# Patient Record
Sex: FEMALE | Race: WHITE | NOT HISPANIC OR LATINO | URBAN - METROPOLITAN AREA
[De-identification: names, ages, dates, MRNs, and addresses within clinical notes are randomized per-mention and may not be internally consistent; named-entity substitution may affect disease eponyms.]

---

## 2017-01-27 ENCOUNTER — OUTPATIENT (OUTPATIENT)
Dept: OUTPATIENT SERVICES | Facility: HOSPITAL | Age: 67
LOS: 1 days | Discharge: HOME | End: 2017-01-27

## 2017-06-27 DIAGNOSIS — K29.50 UNSPECIFIED CHRONIC GASTRITIS WITHOUT BLEEDING: ICD-10-CM

## 2017-06-27 DIAGNOSIS — I10 ESSENTIAL (PRIMARY) HYPERTENSION: ICD-10-CM

## 2017-06-27 DIAGNOSIS — K44.9 DIAPHRAGMATIC HERNIA WITHOUT OBSTRUCTION OR GANGRENE: ICD-10-CM

## 2017-06-27 DIAGNOSIS — K83.9 DISEASE OF BILIARY TRACT, UNSPECIFIED: ICD-10-CM

## 2017-06-27 DIAGNOSIS — K86.2 CYST OF PANCREAS: ICD-10-CM

## 2018-01-05 PROBLEM — Z00.00 ENCOUNTER FOR PREVENTIVE HEALTH EXAMINATION: Status: ACTIVE | Noted: 2018-01-05

## 2018-01-09 ENCOUNTER — RECORD ABSTRACTING (OUTPATIENT)
Age: 68
End: 2018-01-09

## 2018-01-09 DIAGNOSIS — Z87.19 PERSONAL HISTORY OF OTHER DISEASES OF THE DIGESTIVE SYSTEM: ICD-10-CM

## 2018-01-09 DIAGNOSIS — Z87.891 PERSONAL HISTORY OF NICOTINE DEPENDENCE: ICD-10-CM

## 2018-01-09 DIAGNOSIS — Z80.1 FAMILY HISTORY OF MALIGNANT NEOPLASM OF TRACHEA, BRONCHUS AND LUNG: ICD-10-CM

## 2018-01-09 DIAGNOSIS — Z92.89 PERSONAL HISTORY OF OTHER MEDICAL TREATMENT: ICD-10-CM

## 2018-01-09 RX ORDER — VENLAFAXINE 37.5 MG/1
37.5 TABLET ORAL
Refills: 0 | Status: ACTIVE | COMMUNITY

## 2018-01-23 ENCOUNTER — RX RENEWAL (OUTPATIENT)
Age: 68
End: 2018-01-23

## 2018-01-23 RX ORDER — VENLAFAXINE HYDROCHLORIDE 75 MG/1
75 CAPSULE, EXTENDED RELEASE ORAL DAILY
Qty: 30 | Refills: 3 | Status: ACTIVE | OUTPATIENT
Start: 2018-01-23

## 2018-01-25 ENCOUNTER — APPOINTMENT (OUTPATIENT)
Dept: OBGYN | Facility: CLINIC | Age: 68
End: 2018-01-25

## 2018-02-05 ENCOUNTER — CHART COPY (OUTPATIENT)
Age: 68
End: 2018-02-05

## 2018-03-20 ENCOUNTER — TRANSCRIPTION ENCOUNTER (OUTPATIENT)
Age: 68
End: 2018-03-20

## 2018-05-21 ENCOUNTER — TRANSCRIPTION ENCOUNTER (OUTPATIENT)
Age: 68
End: 2018-05-21

## 2018-06-08 ENCOUNTER — TRANSCRIPTION ENCOUNTER (OUTPATIENT)
Age: 68
End: 2018-06-08

## 2018-12-14 ENCOUNTER — TRANSCRIPTION ENCOUNTER (OUTPATIENT)
Age: 68
End: 2018-12-14

## 2019-02-25 NOTE — ASU PATIENT PROFILE, ADULT - PMH
GERD (gastroesophageal reflux disease)    HTN (hypertension)    Hypercholesterolemia    Osteoarthritis    Restless leg syndrome

## 2019-02-25 NOTE — ASU PATIENT PROFILE, ADULT - PSH
FHx: SOHAIL-BSO (total abdominal hysterectomy and bilateral salpingo-oophorectomy)    H/O Spinal surgery  LUMBAR SURGERY  History of bilateral carpal tunnel release    History of intraocular lens implant  BILAT  S/P tonsillectomy and adenoidectomy

## 2019-02-26 ENCOUNTER — OUTPATIENT (OUTPATIENT)
Dept: OUTPATIENT SERVICES | Facility: HOSPITAL | Age: 69
LOS: 1 days | Discharge: HOME | End: 2019-02-26

## 2019-02-26 VITALS — HEART RATE: 74 BPM | RESPIRATION RATE: 18 BRPM | SYSTOLIC BLOOD PRESSURE: 166 MMHG | DIASTOLIC BLOOD PRESSURE: 83 MMHG

## 2019-02-26 VITALS
HEART RATE: 79 BPM | WEIGHT: 149.91 LBS | OXYGEN SATURATION: 97 % | RESPIRATION RATE: 17 BRPM | DIASTOLIC BLOOD PRESSURE: 84 MMHG | SYSTOLIC BLOOD PRESSURE: 147 MMHG | TEMPERATURE: 98 F | HEIGHT: 63 IN

## 2019-02-26 DIAGNOSIS — Z98.890 OTHER SPECIFIED POSTPROCEDURAL STATES: Chronic | ICD-10-CM

## 2019-02-26 DIAGNOSIS — Z90.89 ACQUIRED ABSENCE OF OTHER ORGANS: Chronic | ICD-10-CM

## 2019-02-26 DIAGNOSIS — Z96.1 PRESENCE OF INTRAOCULAR LENS: Chronic | ICD-10-CM

## 2019-02-26 DIAGNOSIS — Z84.2 FAMILY HISTORY OF OTHER DISEASES OF THE GENITOURINARY SYSTEM: Chronic | ICD-10-CM

## 2019-02-26 RX ORDER — SIMVASTATIN 20 MG/1
1 TABLET, FILM COATED ORAL
Qty: 0 | Refills: 0 | COMMUNITY

## 2019-02-26 RX ORDER — OMEPRAZOLE 10 MG/1
40 CAPSULE, DELAYED RELEASE ORAL
Qty: 0 | Refills: 0 | COMMUNITY

## 2019-02-26 RX ORDER — LISINOPRIL 2.5 MG/1
1 TABLET ORAL
Qty: 0 | Refills: 0 | COMMUNITY

## 2019-02-26 RX ORDER — IBUPROFEN 200 MG
1 TABLET ORAL
Qty: 20 | Refills: 0 | OUTPATIENT
Start: 2019-02-26

## 2019-02-26 NOTE — ASU DISCHARGE PLAN (ADULT/PEDIATRIC). - SPECIAL INSTRUCTIONS
DIET:    Resume normal diet  No alcoholic  beverages for 24 hours or if on prescribed narcotic pain medications.    MEDICATION:    Resume your preoperative oral medications.  Check with your physician before starting aspirin, Coumadin, or other blood thinners.  Prescriptions given to you - take as directed.      ACTIVITY:    Rest today and limit your activities for 24 hours.  Do not drive or operate machinery for 24 hours - if you received anesthesia.  When taking pain medication, be careful as you walk or climb stairs.  It is not advisable to drive while taking prescribed pain medication.    SPECIAL INSTRUCTIONS:    _x____ Elevate operative site above heart level or as directed.  __x___ Apply ice to operative site as directed.  _____ Use  sling as directed.  __x___ Exercise fingers.    DRESSING CARE:    ___x__ You may change the dressing 4 days. Keep wound covered with band-aids.  _____ Do not change the dressing until your doctor see you.  _____ You can loosen or rewrap the dressing.  _____  Keep dressing clean and dry.  _____ You may shower in _____ day(s) with the extremity covered by a plastic bag.  __x___ OK to wash hand , including showers, in _4____ day(s).    ADDITIONAL  INFORMATION:    Post operative visit should be scheduled for next week.  If you are not aware of visit please contact office.  If you have any questions or concerns call office at      Notify your doctor if you develop   Fever  Excessive Swelling  Chills   Drainage   Pain not controlled by medication  Persistent numbness in hand or fingers    If an Emergency arises call 911 and/or go to the Emergency Room

## 2019-02-26 NOTE — BRIEF OPERATIVE NOTE - PROCEDURE
<<-----Click on this checkbox to enter Procedure Release of trigger finger of left ring finger  02/26/2019    Active  THOMAS

## 2019-02-28 DIAGNOSIS — M65.342 TRIGGER FINGER, LEFT RING FINGER: ICD-10-CM

## 2019-02-28 DIAGNOSIS — I10 ESSENTIAL (PRIMARY) HYPERTENSION: ICD-10-CM

## 2019-03-08 ENCOUNTER — TRANSCRIPTION ENCOUNTER (OUTPATIENT)
Age: 69
End: 2019-03-08

## 2019-12-09 PROBLEM — E78.00 PURE HYPERCHOLESTEROLEMIA, UNSPECIFIED: Chronic | Status: ACTIVE | Noted: 2019-02-26

## 2019-12-09 PROBLEM — I10 ESSENTIAL (PRIMARY) HYPERTENSION: Chronic | Status: ACTIVE | Noted: 2019-02-26

## 2019-12-09 PROBLEM — K21.9 GASTRO-ESOPHAGEAL REFLUX DISEASE WITHOUT ESOPHAGITIS: Chronic | Status: ACTIVE | Noted: 2019-02-26

## 2019-12-09 PROBLEM — G25.81 RESTLESS LEGS SYNDROME: Chronic | Status: ACTIVE | Noted: 2019-02-26

## 2019-12-09 PROBLEM — M19.90 UNSPECIFIED OSTEOARTHRITIS, UNSPECIFIED SITE: Chronic | Status: ACTIVE | Noted: 2019-02-26

## 2020-02-20 ENCOUNTER — LABORATORY RESULT (OUTPATIENT)
Age: 70
End: 2020-02-20

## 2020-02-20 ENCOUNTER — APPOINTMENT (OUTPATIENT)
Dept: OBGYN | Facility: CLINIC | Age: 70
End: 2020-02-20
Payer: MEDICARE

## 2020-02-20 VITALS — WEIGHT: 166 LBS | HEIGHT: 62 IN | BODY MASS INDEX: 30.55 KG/M2

## 2020-02-20 PROCEDURE — 81003 URINALYSIS AUTO W/O SCOPE: CPT | Mod: QW

## 2020-02-20 PROCEDURE — G0101: CPT

## 2020-02-21 LAB
BILIRUB UR QL STRIP: NORMAL
CLARITY UR: CLEAR
GLUCOSE UR-MCNC: NORMAL
HCG UR QL: NORMAL EU/DL
HGB UR QL STRIP.AUTO: NORMAL
KETONES UR-MCNC: NORMAL
LEUKOCYTE ESTERASE UR QL STRIP: NORMAL
NITRITE UR QL STRIP: NORMAL
PH UR STRIP: 5
PROT UR STRIP-MCNC: NORMAL
SP GR UR STRIP: 1.01

## 2024-02-28 ENCOUNTER — LABORATORY RESULT (OUTPATIENT)
Age: 74
End: 2024-02-28

## 2024-02-29 ENCOUNTER — APPOINTMENT (OUTPATIENT)
Dept: OBGYN | Facility: CLINIC | Age: 74
End: 2024-02-29
Payer: MEDICARE

## 2024-02-29 VITALS — BODY MASS INDEX: 28.34 KG/M2 | WEIGHT: 154 LBS | HEIGHT: 62 IN

## 2024-02-29 DIAGNOSIS — K21.9 GASTRO-ESOPHAGEAL REFLUX DISEASE W/OUT ESOPHAGITIS: ICD-10-CM

## 2024-02-29 DIAGNOSIS — N81.10 CYSTOCELE, UNSPECIFIED: ICD-10-CM

## 2024-02-29 DIAGNOSIS — I10 ESSENTIAL (PRIMARY) HYPERTENSION: ICD-10-CM

## 2024-02-29 DIAGNOSIS — M85.80 OTHER SPECIFIED DISORDERS OF BONE DENSITY AND STRUCTURE, UNSPECIFIED SITE: ICD-10-CM

## 2024-02-29 DIAGNOSIS — G25.81 RESTLESS LEGS SYNDROME: ICD-10-CM

## 2024-02-29 DIAGNOSIS — N39.3 STRESS INCONTINENCE (FEMALE) (MALE): ICD-10-CM

## 2024-02-29 DIAGNOSIS — Z01.419 ENCOUNTER FOR GYNECOLOGICAL EXAMINATION (GENERAL) (ROUTINE) W/OUT ABNORMAL FINDINGS: ICD-10-CM

## 2024-02-29 DIAGNOSIS — Z78.0 ASYMPTOMATIC MENOPAUSAL STATE: ICD-10-CM

## 2024-02-29 PROCEDURE — G0101: CPT

## 2024-03-01 PROBLEM — I10 ESSENTIAL HYPERTENSION: Status: ACTIVE | Noted: 2024-03-01

## 2024-03-01 PROBLEM — M85.80 OSTEOPENIA: Status: ACTIVE | Noted: 2020-02-21

## 2024-03-01 PROBLEM — N39.3 URINARY, INCONTINENCE, STRESS FEMALE: Status: ACTIVE | Noted: 2024-03-01

## 2024-03-01 PROBLEM — K21.9 ACID REFLUX: Status: ACTIVE | Noted: 2024-03-01

## 2024-03-01 PROBLEM — Z78.0 MENOPAUSE: Status: ACTIVE | Noted: 2020-02-21

## 2024-03-01 PROBLEM — Z01.419 WELL WOMAN EXAM: Status: ACTIVE | Noted: 2020-02-21

## 2024-03-01 PROBLEM — N81.10 FEMALE CYSTOCELE: Status: ACTIVE | Noted: 2020-02-21

## 2024-03-01 PROBLEM — G25.81 RESTLESS LEG SYNDROME: Status: ACTIVE | Noted: 2024-03-01

## 2024-03-01 NOTE — DISCUSSION/SUMMARY
[FreeTextEntry1] : 73 YEAR OLD FEMALE LMP 2000 PRESENTS FOR WELL WOMAN GYNECOLOGIC EXAMIANTION AND PAP.LAST SEEN FOR WELL WOMAN EXAMIANTION 2/20/2020; PAP AND HVP HR TESTING DONE AT TAHT TIME WERE NEGATIVE.  NO NEW MEDICAL OR SURGICAL HISTORY SINCE LAST VISIT EXCEPT HAVING BEEN TOLD BY PCP THAT SHE WAS ANEMIC AND W/U WITH COLONOSCOPY WAS DONE AND THERE WAS A BLOCKAGE THAT PREVENTED THE SCOPE FROM BEING ADVANCED. ADDITOINAL TESTING WAS DONE AND PATIENT UNDERWENT SURGERY FOR A "CRIMP" IN HER COLON AND HAD A PARTIAL COLONO RESECTION WAS DONE 8/2023,  ON THE LEFT SIDE ( DESCENDING COLON); PT HAD BEEN TOLD SHE HAD DIVERTICULAR DISEASE BUT SINCE THE SURGERY, THIS SEEMS TO HAVE GOTTEN BETTER; NO TROUBLE WITH BMS. MEDICATIONS; METOPROLOL; OMEPRAZOLE; SIMVASTATIN Q O HS; PRIMAPEXIL FOR                                  RESTLESS LEG SYNDROME MEDICATION ALLERIGE;NKA ROS;BMSPNORMAL; MOTHER WITH HISTORY OF COLON CANCER          + STRESS URINARY INCONTINENCE, MAINLY OVERFLOW          NOT SEXUALLY ACTIVE. BREASTS; DOES BREAST SELF EXAMINATION                    LAST MAMMOGRAPHY WAS DONE 2/3/2024 BIRADS II                    NO FAM HISTORY OF BREAST CANCER +ACTIVE; - MULTIVITAMINS; + CALCIUM SUPPLEMENT; + VIT D; + VIT B; - TOBACCO OR VAPING FRACTURE HISTORY; COMPRESSION FRACTURE OF LOWER BACK IN THE PAST NO FAMILY HISTORY OF OSTEOPOROSIS LAST BONE DENSITY TESITNG WAS DONE 2/3/2024 WITH LOSS, OSTEOPNEIA BOTH HIPS AND                      FOREARM WITH T SCORE -.14 LEFT HIP; -.19 RT HIP; -1.6 LEFT FOREARM  PE;/74; TEMP 97.2; WEIGHT 154 LBS; H;IEGHT 5'2"      BREASTS; N O MASSES OR DISCHARGES      ABDOMEN;SOFT, NO MASSES; ON TENDERNESS TO PALPATION; LOW SMALL MIDLINE      PELVCI; NORMAL EXTERNAL GENITALIA                     NORMAL URETHRAL MEATUS                     NORMAL LABIA MAJORA AND LABIA MINORA                     NORMAL VAGINA WIHTOUT LESION                     2ND DEGREE CYSTOCELE                     NORMAL VAGINAL VAULT WITHOUT LESION                    NO PALPABLE ADNEXAL MASSES                    NO PERINEAL OR KERRI RECTAL LESIONS  IMP; WELL WOMAN          MENOPAUSE          CYSTOCELE          STRESS URINARY INCONTINENCE          OSTEOPENIA          ACID REFLUX                ESSENTIALL HYPERTENSION          RESTLESS LEG SYNDROME  PLAN; THIN PREP PAP WITH HR HPV TESTING DONE-PT TO CALL FOR RESULTS IN 2 WKS             BREAST SELF EXAMINATION MONTHLY CONTINUED TO BE STRONGLY ADVISED             ANNUAL MAMMOGPRAHY ADVISED AND SCRIPT GVIEN FOR 2/2025              RETURN TO OFFICE ONE YEAR OR PRN

## 2024-03-07 ENCOUNTER — NON-APPOINTMENT (OUTPATIENT)
Age: 74
End: 2024-03-07

## 2024-03-08 ENCOUNTER — NON-APPOINTMENT (OUTPATIENT)
Age: 74
End: 2024-03-08